# Patient Record
Sex: MALE | Race: ASIAN | NOT HISPANIC OR LATINO
[De-identification: names, ages, dates, MRNs, and addresses within clinical notes are randomized per-mention and may not be internally consistent; named-entity substitution may affect disease eponyms.]

---

## 2023-06-28 PROBLEM — Z00.00 ENCOUNTER FOR PREVENTIVE HEALTH EXAMINATION: Status: ACTIVE | Noted: 2023-06-28

## 2023-06-29 ENCOUNTER — NON-APPOINTMENT (OUTPATIENT)
Age: 33
End: 2023-06-29

## 2023-06-29 ENCOUNTER — APPOINTMENT (OUTPATIENT)
Dept: OTOLARYNGOLOGY | Facility: CLINIC | Age: 33
End: 2023-06-29
Payer: MEDICAID

## 2023-06-29 VITALS
SYSTOLIC BLOOD PRESSURE: 117 MMHG | WEIGHT: 162 LBS | HEART RATE: 66 BPM | DIASTOLIC BLOOD PRESSURE: 76 MMHG | BODY MASS INDEX: 24.55 KG/M2 | HEIGHT: 68 IN

## 2023-06-29 DIAGNOSIS — Z86.39 PERSONAL HISTORY OF OTHER ENDOCRINE, NUTRITIONAL AND METABOLIC DISEASE: ICD-10-CM

## 2023-06-29 DIAGNOSIS — Z80.3 FAMILY HISTORY OF MALIGNANT NEOPLASM OF BREAST: ICD-10-CM

## 2023-06-29 DIAGNOSIS — Z87.19 PERSONAL HISTORY OF OTHER DISEASES OF THE DIGESTIVE SYSTEM: ICD-10-CM

## 2023-06-29 DIAGNOSIS — Z80.0 FAMILY HISTORY OF MALIGNANT NEOPLASM OF DIGESTIVE ORGANS: ICD-10-CM

## 2023-06-29 PROCEDURE — 99204 OFFICE O/P NEW MOD 45 MIN: CPT | Mod: 25

## 2023-06-29 PROCEDURE — 31231 NASAL ENDOSCOPY DX: CPT

## 2023-06-29 RX ORDER — LEVOCETIRIZINE DIHYDROCHLORIDE 5 MG/1
TABLET, FILM COATED ORAL
Refills: 0 | Status: ACTIVE | COMMUNITY

## 2023-06-29 RX ORDER — FLUTICASONE PROPIONATE 50 UG/1
50 SPRAY, METERED NASAL TWICE DAILY
Qty: 1 | Refills: 1 | Status: ACTIVE | COMMUNITY
Start: 2023-06-29 | End: 1900-01-01

## 2023-06-29 NOTE — ASSESSMENT
[FreeTextEntry1] : Lenard Tovar presents for evaluation of chronic nasal obstruction. He has chronic rhinitis secondary to allergy. Sinonasal endoscopy was performed showing septal deviation to the left and bilateral inferior turbinate hypertrophy. He notes snoring and daytime sleepiness. Will order home sleep study and obtain CT sinus to evaluate nasal obstruction further.\par \par - Home sleep study.\par - CT sinus\par - Start fluticasone 2 sprays to each nostril BID.\par - Follow up after CT.

## 2023-06-29 NOTE — REVIEW OF SYSTEMS
[Sneezing] : sneezing [Seasonal Allergies] : seasonal allergies [Problem Snoring] : problem snoring [Negative] : Heme/Lymph

## 2023-06-29 NOTE — REASON FOR VISIT
[Initial Evaluation] : an initial evaluation for [FreeTextEntry2] : Breathing through one side of nose only

## 2023-06-29 NOTE — PHYSICAL EXAM
[Nasal Endoscopy Performed] : nasal endoscopy was performed, see procedure section for findings [] : septum deviated to the left [de-identified] : Bilateral inferior turbinate hypertrophy [Midline] : trachea located in midline position [Removed] : palatine tonsils previously removed [Normal] : no rashes

## 2023-06-29 NOTE — HISTORY OF PRESENT ILLNESS
[de-identified] : Lenard Tovar is a 31 yo male who presents for evaluation of nasal obstruction. He notes that this can alternate between sides. He denies rhinorrhea or postnasal drainage. He denies sinus pressure. He denies history of recurrent sinusitis. He has been tested for allergies before and per his report, this was positive for several allergens. He does not use any nasal sprays. He denies fevers or chills in the past week. He has not had previous nasal surgeries.\par \par He notes snoring at night. He is unsure of pauses in breathing at night. He notes daytime sleepiness.

## 2023-07-08 ENCOUNTER — APPOINTMENT (OUTPATIENT)
Dept: CT IMAGING | Facility: CLINIC | Age: 33
End: 2023-07-08

## 2023-07-13 ENCOUNTER — OUTPATIENT (OUTPATIENT)
Dept: OUTPATIENT SERVICES | Facility: HOSPITAL | Age: 33
LOS: 1 days | End: 2023-07-13
Payer: MEDICAID

## 2023-07-13 DIAGNOSIS — G47.33 OBSTRUCTIVE SLEEP APNEA (ADULT) (PEDIATRIC): ICD-10-CM

## 2023-07-13 PROCEDURE — 95800 SLP STDY UNATTENDED: CPT

## 2023-07-13 PROCEDURE — 95800 SLP STDY UNATTENDED: CPT | Mod: 26

## 2023-07-18 ENCOUNTER — TRANSCRIPTION ENCOUNTER (OUTPATIENT)
Age: 33
End: 2023-07-18

## 2023-07-25 ENCOUNTER — APPOINTMENT (OUTPATIENT)
Dept: CT IMAGING | Facility: CLINIC | Age: 33
End: 2023-07-25
Payer: MEDICAID

## 2023-07-25 PROCEDURE — 70486 CT MAXILLOFACIAL W/O DYE: CPT

## 2023-07-27 ENCOUNTER — APPOINTMENT (OUTPATIENT)
Dept: OTOLARYNGOLOGY | Facility: CLINIC | Age: 33
End: 2023-07-27
Payer: MEDICAID

## 2023-07-27 VITALS
WEIGHT: 162 LBS | DIASTOLIC BLOOD PRESSURE: 77 MMHG | HEIGHT: 68 IN | BODY MASS INDEX: 24.55 KG/M2 | HEART RATE: 76 BPM | SYSTOLIC BLOOD PRESSURE: 122 MMHG

## 2023-07-27 DIAGNOSIS — J34.2 DEVIATED NASAL SEPTUM: ICD-10-CM

## 2023-07-27 DIAGNOSIS — J34.89 OTHER SPECIFIED DISORDERS OF NOSE AND NASAL SINUSES: ICD-10-CM

## 2023-07-27 DIAGNOSIS — R06.83 SNORING: ICD-10-CM

## 2023-07-27 PROCEDURE — 99214 OFFICE O/P EST MOD 30 MIN: CPT

## 2023-07-27 NOTE — DATA REVIEWED
[de-identified] : CT sinus 7/25/23:\par FINDINGS:\par FRONTAL SINUSES: Well developed and clear.\par ETHMOID SINUSES: Mild mucosal thickening\par MAXILLARY SINUSES: Mild polypoid mucosal thickening\par SPHENOID SINUSES: Small bilateral polyps or retention cysts\par NASAL SEPTUM: Deviated to the left with a septal spur forming a contact point with the inferior turbinate\par NASAL CAVITY/NASOPHARYNX: No polypoid mass.\par POSTOP CHANGES: None seen.\par RIGHT OSTIOMEATAL UNIT: A Yusef cell narrows the infundibulum. A pneumatized lateralized middle turbinate narrows the middle meatus\par LEFT OSTIOMEATAL UNIT: A lateralized uncinate process narrows the infundibulum. A pneumatized paradoxical middle turbinate narrows the middle meatus.\par SPHENOETHMOIDAL RECESSES: Patent.\par RIGHT FRONTAL RECESS: Narrowed by a large supraorbital ethmoid cell and agger nasi cell\par LEFT FRONTAL RECESS: Narrowed by a prominent agger nasi cell\par BONES: The bones surrounding the paranasal sinuses are intact. 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 [de-identified] : PSG 7/17/23:\par AHI of 0.4 with O2 moo of 92%.\par RDI - 11

## 2023-07-27 NOTE — ASSESSMENT
[FreeTextEntry1] : Lenard Tovar presents for follow-up. He has history of chronic rhinitis and chronic nasal obstruction. He also notes snoring. Sleep study was reviewed and was normal. Previous sinonasal endoscopy showed  septal deviation to the left and bilateral inferior turbinate hypertrophy. He used fluticasone nasal spray without significant benefit. CT sinus was obtained and reviewed, showing septal deviation to the left and bilateral mercedez bullosa.\par \par We discussed that he is a candidate for septoplasty, submucous resection of bilateral inferior turbinates, resection of bilateral mercedez bullosa. Risks, benefits, and alternatives of surgery were discussed. Risks of surgery were discussed with the patient including but not limited to bleeding, infection, septal perforation, numbness of the front two teeth, empty nose syndrome, and loss of sense of smell. Unique concerns related to septal perforation were discussed including mucus crusting, pain, whistling with breathing, and bleeding. Challenges of management of septal perforation were reiterated. The patient understands these risks and wishes to proceed as discussed. All questions were answered.\par \par We will schedule surgery at his earliest convenience.\par \par Follow up 1 week postop.\par

## 2023-07-27 NOTE — HISTORY OF PRESENT ILLNESS
[de-identified] : Lenard Tovar is a 33 yo male who presents for evaluation of nasal obstruction. He notes that this can alternate between sides. He denies rhinorrhea or postnasal drainage. He denies sinus pressure. He denies history of recurrent sinusitis. He has been tested for allergies before and per his report, this was positive for several allergens. He does not use any nasal sprays. He denies fevers or chills in the past week. He has not had previous nasal surgeries.\par \par He notes snoring at night. He is unsure of pauses in breathing at night. He notes daytime sleepiness. [FreeTextEntry1] : 7/27/23 - Lenard Tovar presents for follow-up. He tried using fluticasone sprays. He notes continued snoring. He has continued nasal obstruction. He denies rhinorrhea or postnasal drainage. He denies sinus pressure. HE denies fevers or chills.

## 2023-07-27 NOTE — PHYSICAL EXAM
[] : septum deviated to the left [Midline] : trachea located in midline position [Removed] : palatine tonsils previously removed [Normal] : no rashes [de-identified] : Bilateral inferior turbinate hypertrophy

## 2023-09-25 ENCOUNTER — APPOINTMENT (OUTPATIENT)
Dept: OTOLARYNGOLOGY | Facility: AMBULATORY MEDICAL SERVICES | Age: 33
End: 2023-09-25
Payer: COMMERCIAL

## 2023-09-25 PROCEDURE — 31240 NSL/SNS NDSC CNCH BULL RESCJ: CPT | Mod: 50

## 2023-09-25 PROCEDURE — 30520 REPAIR OF NASAL SEPTUM: CPT

## 2023-09-25 PROCEDURE — 30140 RESECT INFERIOR TURBINATE: CPT | Mod: 50

## 2023-09-25 RX ORDER — ONDANSETRON 4 MG/1
4 TABLET, ORALLY DISINTEGRATING ORAL EVERY 8 HOURS
Qty: 10 | Refills: 0 | Status: ACTIVE | COMMUNITY
Start: 2023-09-25 | End: 1900-01-01

## 2023-09-25 RX ORDER — OXYCODONE 5 MG/1
5 TABLET ORAL EVERY 4 HOURS
Qty: 15 | Refills: 0 | Status: ACTIVE | COMMUNITY
Start: 2023-09-25 | End: 1900-01-01

## 2023-09-25 RX ORDER — CEPHALEXIN 500 MG/1
500 CAPSULE ORAL
Qty: 16 | Refills: 0 | Status: ACTIVE | COMMUNITY
Start: 2023-09-25 | End: 1900-01-01

## 2023-10-03 ENCOUNTER — APPOINTMENT (OUTPATIENT)
Dept: OTOLARYNGOLOGY | Facility: CLINIC | Age: 33
End: 2023-10-03
Payer: COMMERCIAL

## 2023-10-03 VITALS
BODY MASS INDEX: 25.01 KG/M2 | HEIGHT: 68 IN | WEIGHT: 165 LBS | SYSTOLIC BLOOD PRESSURE: 122 MMHG | HEART RATE: 71 BPM | DIASTOLIC BLOOD PRESSURE: 81 MMHG

## 2023-10-03 DIAGNOSIS — J31.0 CHRONIC RHINITIS: ICD-10-CM

## 2023-10-03 DIAGNOSIS — Z98.890 OTHER SPECIFIED POSTPROCEDURAL STATES: ICD-10-CM

## 2023-10-03 PROCEDURE — 99024 POSTOP FOLLOW-UP VISIT: CPT

## 2025-01-30 ENCOUNTER — APPOINTMENT (OUTPATIENT)
Dept: HUMAN REPRODUCTION | Facility: CLINIC | Age: 35
End: 2025-01-30

## 2025-02-07 ENCOUNTER — APPOINTMENT (OUTPATIENT)
Dept: HUMAN REPRODUCTION | Facility: CLINIC | Age: 35
End: 2025-02-07
Payer: COMMERCIAL

## 2025-02-07 PROCEDURE — 36415 COLL VENOUS BLD VENIPUNCTURE: CPT

## 2025-02-07 PROCEDURE — 89322 SEMEN ANAL STRICT CRITERIA: CPT
